# Patient Record
Sex: MALE | Race: ASIAN | NOT HISPANIC OR LATINO | URBAN - METROPOLITAN AREA
[De-identification: names, ages, dates, MRNs, and addresses within clinical notes are randomized per-mention and may not be internally consistent; named-entity substitution may affect disease eponyms.]

---

## 2020-11-29 ENCOUNTER — EMERGENCY (EMERGENCY)
Facility: HOSPITAL | Age: 30
LOS: 0 days | Discharge: HOME | End: 2020-11-29
Attending: EMERGENCY MEDICINE
Payer: COMMERCIAL

## 2020-11-29 VITALS — RESPIRATION RATE: 18 BRPM | HEART RATE: 78 BPM | DIASTOLIC BLOOD PRESSURE: 74 MMHG | SYSTOLIC BLOOD PRESSURE: 119 MMHG

## 2020-11-29 VITALS
HEIGHT: 73 IN | SYSTOLIC BLOOD PRESSURE: 118 MMHG | HEART RATE: 82 BPM | WEIGHT: 240.08 LBS | DIASTOLIC BLOOD PRESSURE: 69 MMHG | TEMPERATURE: 98 F | OXYGEN SATURATION: 97 % | RESPIRATION RATE: 16 BRPM
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
## 2020-11-29 DIAGNOSIS — S43.004A UNSPECIFIED DISLOCATION OF RIGHT SHOULDER JOINT, INITIAL ENCOUNTER: ICD-10-CM

## 2020-11-29 DIAGNOSIS — M25.511 PAIN IN RIGHT SHOULDER: ICD-10-CM
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
## 2020-11-29 PROCEDURE — 99284 EMERGENCY DEPT VISIT MOD MDM: CPT

## 2020-11-29 PROCEDURE — 73030 X-RAY EXAM OF SHOULDER: CPT | Mod: 26,RT,76

## 2020-11-29 RX ORDER — DIAZEPAM 5 MG
5 TABLET ORAL ONCE
Refills: 0 | Status: DISCONTINUED | OUTPATIENT
Start: 2020-11-29 | End: 2020-11-29

## 2020-11-29 RX ORDER — OXYCODONE AND ACETAMINOPHEN 5; 325 MG/1; MG/1
1 TABLET ORAL ONCE
Refills: 0 | Status: DISCONTINUED | OUTPATIENT
Start: 2020-11-29 | End: 2020-11-29

## 2020-11-29 RX ADMIN — Medication 5 MILLIGRAM(S): at 09:57

## 2020-11-29 RX ADMIN — Medication 5 MILLIGRAM(S): at 08:34

## 2020-11-29 RX ADMIN — OXYCODONE AND ACETAMINOPHEN 1 TABLET(S): 5; 325 TABLET ORAL at 08:34

## 2020-11-29 NOTE — ED PROVIDER NOTE - CLINICAL SUMMARY MEDICAL DECISION MAKING FREE TEXT BOX
Pt seen for R shoulder dislocation, Orthopedics consulted for injury - Pt seen for R shoulder dislocation, Orthopedics consulted for injury - reduced by ortho - otherwise placed in shoulder immobilizer and will dc with outpt orho follow up.

## 2020-11-29 NOTE — CONSULT NOTE ADULT - ASSESSMENT
30M with right anterior shoulder dislocation    Plan:   XR imaging reviewed.   Shoulder was closed reduced and placed in shoulder immobilizer. Post reduction films obtained and confirmed reduction.   Pain control PRN  PWB in shoulder immobilizer  Discussed with patient to avoid lifting anything heavier than a coffee cup and to avoid overhead movements as well as external rotation of right shoulder. Instructed to wear shoulder immobilizer at all times unless showering - even during sleep - until otherwise instructed at follow up. Patient expressed clear understanding of limitations and all questions were answered to patient satisfaction.   No further orthopedic intervention indicated at this time. Ortho stable for discharge. Patient can follow up with his previous orthopedic surgeon or Dr. Miramontes as outpatient for further evaluation/treatment.   Will discuss with Dr. Miramontes and advise if any changes to treatment plan.

## 2020-11-29 NOTE — CONSULT NOTE ADULT - SUBJECTIVE AND OBJECTIVE BOX
30M presents to the Emergency Department for evaluation of right shoulder pain. He reports that he was playing basketball and went for a steal, and while his arm was fully extended the ball hit his hand and he felt his shoulder pop out. Patient states that he has had one prior right shoulder dislocation in 2012 after which he followed with an outside orthopedist. Patient is right hand dominant. He denies fall/head strike/LOC, numbness/tingling, weakness, any other acute orthopedic complaints.     Vital Signs Last 24 Hrs  T(C): 36.4 (29 Nov 2020 07:58), Max: 36.4 (29 Nov 2020 07:58)  T(F): 97.6 (29 Nov 2020 07:58), Max: 97.6 (29 Nov 2020 07:58)  HR: 82 (29 Nov 2020 07:58) (82 - 82)  BP: 118/69 (29 Nov 2020 07:58) (118/69 - 118/69)  BP(mean): --  RR: 16 (29 Nov 2020 07:58) (16 - 16)  SpO2: 97% (29 Nov 2020 07:58) (97% - 97%)    Exam:  General: Awake, alert, mild painful distress  RUE:   Skin intact. No abrasions/lacerations. Positive sulcus sign.   TTP around right shoulder. No other bony tenderness  Pain with passive ROM of R shoulder. FROM of elbow/wrist/hand without pain.   SILT. +Ax/Msc/Rad/Med/Uln/AIN/PIN  Radial/ulnar pulses intact  Compartments soft and compressible    Secondary Assessment:  NC/AT, NTTP of clavicles, NTTP of C-,T-,L-Spine, NTTP of Pelvis  LUE: NTTP of Shoulder, Elbow, Wrist, Hand; NT with AROM/PROM of Shoulder, Elbow, Wrist, Hand; AIN/PIN/Med/Uln/Msc/Rad/Ax intact  LEs: Able to SLR, NT with Log Roll, NT with Heel Strike, NTTP of Hips, Knees, Ankles, Feet; NT with AROM/PROM of Hips, Knees, Ankles, Feet; Q/H/Gsc/TA/EHL/FHL intact    XR reviewed with evidence of R shoulder anterior dislocation    An injection was offered to the patient for analgesia prior to procedure. Pt gave verbal consent for injection and procedure. The skin was prepped in sterile fashion with alcohol scrub. The fracture site was then injected with 10 mL 1% plain lidocaine and 10ml sterile water. Time was allowed for anesthetic effect to occur. Patient was also given Valium for muscle relaxation prior to reduction attempt. Once the pt was comfortable, the right shoulder was manipulated/closed reduced. Patient was placed in shoulder immobilizer. Neurovascular exam was unchanged. SILT digits 1-5 and able to wiggle fingers. Axillary nerve intact. Post reduction films were ordered and reviewed.

## 2020-11-29 NOTE — ED PROVIDER NOTE - OBJECTIVE STATEMENT
30 year old male with no significant PMH presenting to ED due to R shoulder dislocation s/p playing basketball and trying to steal a ball- states he reached in and felt shoulder pull out. Since then has had difficulty moving R shoulder - pt has had shoulder dislocation before and states that it feels very similar to what it has been in past.

## 2020-11-29 NOTE — ED PROVIDER NOTE - MUSCULOSKELETAL MINIMAL EXAM
R shoulder with abducted with joint laxity noted, unable to range - congruent finding with dislocation, distal sensation/motor intact, Cap Refill <2 sec

## 2020-11-29 NOTE — ED ADULT NURSE NOTE - OBJECTIVE STATEMENT
pt c.o of dislocation of r shoulder. pt was playing basketball and now limited rom to r shoulder, 8/10 pain, 10/10 eith movement. + sensation , good grasp, no numbness tingling.. deformity to r shoulder.

## 2020-11-29 NOTE — ED PROVIDER NOTE - PATIENT PORTAL LINK FT
You can access the FollowMyHealth Patient Portal offered by [facility] by registering at the following website: [email or web address]. By joining [employer]’s FollowMyHealth portal, you will also be able to view your health information using other applications (apps) compatible with our system.

## 2020-11-29 NOTE — ED ADULT TRIAGE NOTE - CHIEF COMPLAINT QUOTE
c/o dislocation rt shoulder playing basketball + distal pulses cap refill less than 2 sec, + sensation, + pain

## 2024-09-03 NOTE — ED PROVIDER NOTE - CHIEF COMPLAINT
The patient is a 30y Male complaining of shoulder pain/injury. stairs to enter home/stairs within home